# Patient Record
Sex: MALE | Race: WHITE | URBAN - METROPOLITAN AREA
[De-identification: names, ages, dates, MRNs, and addresses within clinical notes are randomized per-mention and may not be internally consistent; named-entity substitution may affect disease eponyms.]

---
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
## 2019-01-07 ENCOUNTER — APPOINTMENT (OUTPATIENT)
Dept: NEUROLOGY | Facility: CLINIC | Age: 19
End: 2019-01-07
Payer: MEDICAID

## 2019-01-07 VITALS
HEART RATE: 82 BPM | SYSTOLIC BLOOD PRESSURE: 128 MMHG | DIASTOLIC BLOOD PRESSURE: 58 MMHG | BODY MASS INDEX: 20.32 KG/M2 | WEIGHT: 150 LBS | HEIGHT: 72 IN

## 2019-01-07 DIAGNOSIS — R06.83 SNORING: ICD-10-CM

## 2019-01-07 DIAGNOSIS — Z78.9 OTHER SPECIFIED HEALTH STATUS: ICD-10-CM

## 2019-01-07 DIAGNOSIS — Z81.8 FAMILY HISTORY OF OTHER MENTAL AND BEHAVIORAL DISORDERS: ICD-10-CM

## 2019-01-07 PROCEDURE — 99204 OFFICE O/P NEW MOD 45 MIN: CPT

## 2019-01-07 NOTE — DISCUSSION/SUMMARY
[FreeTextEntry1] : Benjamin Nolan is a 18 year old man with chronic attention difficulties. \par In addition he has difficulty sleeping. \par \par 1. Attention disturbance - I think that it is likely that he has ADHD.\par I am ordering neurotrax to help clarify the diagnosis of ADHD.\par I am also ordering EEG to look for any encephalopathy or problems that would cause an ADHD like picture.\par We discussed non-medication strategies to help with ADHD - good sleep, healthy diet, regular exercise, taking breaks when studying, using a planner/organizer.\par After the above testing we can discuss different medication options.\par \par 2. Insomnia - sleep deprivation is also likely contributing to his attention disturbances.\par We discussed the following recommendations to improve sleep hygiene and insomnia.\par - The bed should only be used for sleep and intimacy. No reading or watching television in bed.\par -  Avoid trying to sleep/go to bed only when sleepy. If you cannot fall asleep at the beginning of the night or in the middle of the night get out of bed after 15 minutes and do something relaxing (read, watch television, etc.)\par -  Wake up at the same time every day.\par -  No naps during the day\par -  No caffeine after noon \par -  Do not watch the clock at night.\par - Avoid direct sunlight late in the day/ wear sunglasses after 4 PM\par - Do not use the computer/tablets for 1-2 hours prior to bedtime\par - Schedule thinking time early in the evening and have relaxation time for one hour before bed\par - Practice relaxation training that can be done at night if you cannot sleep\par - Exercise for 20 minutes in the late afternoon/early evening or take a hot bath 2-4 hours before bedtime\par \par Medication can be considered in the future if necessary.\par \par If his brother notes more snoring we can get a home sleep study to evaluate for obstructive sleep apnea.\par \par I will follow up with him in 3-4 weeks, sooner if needed. \par

## 2019-01-07 NOTE — PROCEDURE
[FreeTextEntry1] : Adult ADHD Self-Report Scale. \par He noted that symptoms were often or very often in 5 out of 6 symptoms in part A.\par He noted that symptoms are very often in 12 out of 13 in part B.

## 2019-01-07 NOTE — HISTORY OF PRESENT ILLNESS
[FreeTextEntry1] : He reports that he has been having a lot of difficulty focusing and is very fidgety.\par He says that this has always been an issue in childhood but he does not believe that he was every evaluated. \par He was able to skate by in high school and he graduated with an 85 average.\par He was able to get a 3.8 during his first semester in college but he had a 2.4 during his second semester. \par He is currently a sophomore at [employer]. \par He reports having difficulty focusing in class. He has difficulty focusing at home. He can get side tracked in the middle of a simple household chore. \par He says that he was hyperactive as a kid and he still is. \par He reports being impulsive. He impulsively makes decisions and buys things. \par \par He can fall asleep easily during the day but has difficulty sleeping at night.\par He goes to bed between 12-1 AM. He does not feel like he fully falls asleep. He constantly tosses and turns. He eventually gets out of bed between 9-10 AM. He feels as if he is not getting quality sleep.\par He tried taking melatonin but he did not feel any different.\par He shares a room with his brother who tells him that he snores. He has not witnessed pauses in his breathing but his brother typically falls asleep earlier.\par He always has an urge to move around. He taps his feet at night in his room. He says this is to stretch his back out which bothers him. 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 He does make small mistakes but he does not struggle as much as when he is at school. \par He describes his mood as "happy go star".\par \par He has a sister who has ADHD and is on Adderall.\par \par

## 2019-01-07 NOTE — PHYSICAL EXAM
[FreeTextEntry1] : Examination:\par Constitutional: normal, no apparent distress\par Eyes: normal conjunctiva b/l, no ptosis, visual fields full\par Respiratory: no respiratory distress, normal effort, normal auscultation\par Cardiovascular: normal rate, rhythm, no murmurs\par Neck: supple, no masses\par Vascular: carotids normal\par Skin: normal color, no rashes\par Psych: normal mood, affect\par \par Neurological:\par Memory: normal memory, oriented to person, place, time\par Language intact/no aphasia\par Cranial Nerves: II-XII intact, Pupils equally round and reactive to light, ocular muscles/movements intact, no ptosis, no facial weakness, tongue protrudes normally in the midline, \par Motor: normal tone, no pronator drift, full strength in all four extremities in the proximal and distal muscle groups\par Coordination: Fine motor movements intact, rapid alternating movements intact, finger to nose intact bilaterally\par Sensory: intact to light touch, vibration, joint position sense, negative Romberg examination\par DTRs: symmetric, 2+ in b/l triceps, 2+ in b/l biceps, 2+ in b/l brachioradialis, 2+ in bilateral patellars, 2+ in bilateral Achilles, Babinskis negative bilaterally\par Gait: narrow based, steady, able to walk on heels, toes, tandem gait\par \par Other: fidgeting, tapping foot, rapid speech\par

## 2019-01-07 NOTE — CONSULT LETTER
[Dear  ___] : Dear  [unfilled], [Consult Letter:] : I had the pleasure of evaluating your patient, [unfilled]. [Please see my note below.] : Please see my note below. [Consult Closing:] : Thank you very much for allowing me to participate in the care of this patient.  If you have any questions, please do not hesitate to contact me. [FreeTextEntry2] : Xavi Lopez [FreeTextEntry3] : Sincerely,\par \par \par Cassandra Aleman MD\par Diplomate, American Academy of Psychiatry and Neurology\par Board Certified in the Subspecialty of Clinical Neurophysiology\par Board Certified in the Subspecialty of Sleep Medicine\par Board Certified in the Subspecialty of Epilepsy\par

## 2019-01-14 ENCOUNTER — APPOINTMENT (OUTPATIENT)
Dept: NEUROLOGY | Facility: CLINIC | Age: 19
End: 2019-01-14
Payer: MEDICAID

## 2019-01-14 PROCEDURE — 95816 EEG AWAKE AND DROWSY: CPT

## 2019-01-24 ENCOUNTER — APPOINTMENT (OUTPATIENT)
Dept: NEUROLOGY | Facility: CLINIC | Age: 19
End: 2019-01-24
Payer: MEDICAID

## 2019-01-24 VITALS
DIASTOLIC BLOOD PRESSURE: 74 MMHG | WEIGHT: 150 LBS | SYSTOLIC BLOOD PRESSURE: 116 MMHG | HEART RATE: 85 BPM | BODY MASS INDEX: 20.32 KG/M2 | HEIGHT: 72 IN

## 2019-01-24 DIAGNOSIS — G47.00 INSOMNIA, UNSPECIFIED: ICD-10-CM

## 2019-01-24 DIAGNOSIS — H53.9 UNSPECIFIED VISUAL DISTURBANCE: ICD-10-CM

## 2019-01-24 PROCEDURE — 99213 OFFICE O/P EST LOW 20 MIN: CPT

## 2019-01-24 PROCEDURE — 96132 NRPSYC TST EVAL PHYS/QHP 1ST: CPT | Mod: 59

## 2019-01-24 NOTE — HISTORY OF PRESENT ILLNESS
[FreeTextEntry1] : I last saw Mr. Nolan on 1/7/19.\par He started his spring semester this week.\par He has no changes in his symptoms.\par He continues to have difficulty with paying attention.\par He says that his family has reported these symptoms for years.\par \par He felt stumped on the neurotrax test and the speed that was needed to complete it.\par He did find that he was able to focus on the computer test.\par \par He does say that he struggles when he is presented with information that he has to learn visually such as on a white board. \par Sometimes if he is reading something he may skip a word.\par \par He has not followed any of the recommendations for sleep hygiene.

## 2019-01-24 NOTE — DATA REVIEWED
[de-identified] : EEG 1/14/19: normal [de-identified] : Neurotrax 1/24/19:\par Global cognitive score 97.1\par Memory 97.4\par Executive function 105.8\par Attention 103\par Information processing speed 94.4\par Visual spatial 85\par More than 1 standard deviation below average in no domains.\par Below average in memory, information processing speed, visual spatial and global cognitive score\par Above average in executive function and attention

## 2019-01-24 NOTE — PROCEDURE
[FreeTextEntry1] : Neurotrax 1/24/19:\par Global cognitive score 97.1\par Memory 97.4\par Executive function 105.8\par Attention 103\par Information processing speed 94.4\par Visual spatial 85\par More than 1 standard deviation below average in no domains.\par Below average in memory, information processing speed, visual spatial and global cognitive score\par Above average in executive function and attention

## 2019-01-24 NOTE — DISCUSSION/SUMMARY
[FreeTextEntry1] : Benjamin Nolan is a 18 year old man with chronic attention difficulties. \par In addition he has difficulty sleeping. \par EEG is normal.\par Neurotrax showed scores which were below average in memory, information processing speed, visual spatial and global cognitive score and above average in executive function and attention.\par Although these scores are not classic for ADHD, his reported symptoms and the surveys that he filled out at the last visit are suggestive of ADHD.\par \par I am prescribing a trial of Adderall XR 20 mg daily. \par We discussed some common side effects of stimulants including appetite suppression, anxiety, palpitations, insomnia. I advised him to skip the dose on weekends so that he will decrease the chances of building a tolerance.\par \par We discussed other non medication strategies to help with ADHD.\par \par I am also ordering an MRI of the brain to rule out any structural changes since his visual spatial scores are lower than expected.\par \par I will follow up with him in about one month, sooner if needed. \par \par I again discussed the need to focus on sleep hygiene to help with insomnia.\par \par

## 2019-01-24 NOTE — PHYSICAL EXAM
[FreeTextEntry1] : Examination:\par Constitutional: normal, no apparent distress\par Eyes: normal conjunctiva b/l, no ptosis, visual fields full\par Respiratory: no respiratory distress, normal effort, normal auscultation\par Cardiovascular: normal rate, rhythm, no murmurs\par Neck: supple, no masses\par Vascular: carotids normal\par Skin: normal color, no rashes\par Psych: normal mood, affect\par \par Neurological:\par Memory: normal memory, oriented to person, place, time\par Language intact/no aphasia. Rapid speech\par Cranial Nerves: II-XII intact, Pupils equally round and reactive to light, ocular muscles/movements intact, no ptosis, no facial weakness, tongue protrudes normally in the midline, \par Motor: normal tone, no pronator drift, full strength in all four extremities in the proximal and distal muscle groups\par Coordination: Fine motor movements intact, rapid alternating movements intact, finger to nose intact bilaterally\par Sensory: intact to light touch\par DTRs: symmetric, normal\par Gait: narrow based, steady\par

## 2019-02-19 ENCOUNTER — APPOINTMENT (OUTPATIENT)
Dept: NEUROLOGY | Facility: CLINIC | Age: 19
End: 2019-02-19

## 2019-10-10 ENCOUNTER — APPOINTMENT (OUTPATIENT)
Dept: NEUROLOGY | Facility: CLINIC | Age: 19
End: 2019-10-10
Payer: MEDICAID

## 2019-10-10 VITALS
HEART RATE: 67 BPM | SYSTOLIC BLOOD PRESSURE: 143 MMHG | BODY MASS INDEX: 20.32 KG/M2 | WEIGHT: 150 LBS | HEIGHT: 72 IN | DIASTOLIC BLOOD PRESSURE: 56 MMHG

## 2019-10-10 PROCEDURE — 99213 OFFICE O/P EST LOW 20 MIN: CPT

## 2019-10-10 NOTE — DISCUSSION/SUMMARY
[FreeTextEntry1] : Benjamin Nolan is a 18 year old man with chronic attention difficulties. \par In addition he has difficulty sleeping. \par EEG is normal.\par Neurotrax showed scores which were below average in memory, information processing speed, visual spatial and global cognitive score and above average in executive function and attention.\par Although these scores are not classic for ADHD, his reported symptoms and the surveys that he filled out at the last visit are suggestive of ADHD.\par \par He has found Adderall ER 20 mg to be very effective and can continue to use this on school days.\par \par I advised him to continue to skip the dose on weekends so that he will decrease the chances of building a tolerance.\par \par He is trying to gain weight and is using nutritional supplements such as Ensure.\par We discussed other non medication strategies to help with ADHD.\par \par I again discussed the need to focus on sleep hygiene to help with insomnia.\par \par follow-up in six months, sooner if needed. \par

## 2019-10-10 NOTE — HISTORY OF PRESENT ILLNESS
[FreeTextEntry1] : I last saw Mr. Nolan on 1/24/19.\par He had shoulder surgery in August and is in physical therapy.\par He did not use his Adderall over the summer when he was not in school.\par He is in school with classes four days per week.\par On Mondays he has classes starting from 8 AM and ends at 3 PM.\par He is using Adderall for school days and finds it to be effective.\par He is able to focus in class. \par Last semester he had a 4.0 GPA.\par He has noticed a difference in his ability to concentrate if he does not take it.\par The brand of medication that the pharmacy has given him he likes better. He used to feel withdrawn but since he was switched from "grey" pills to "blue" pills he feels better.\par He sometimes has a dry mouth.\par Insomnia has been a problem for him prior to starting medication.\par Occasionally he feels some palpitations but he can slow it down with deep breathing. \par He had some appetite suppression initially but is now eating regularly.\par

## 2019-10-10 NOTE — CONSULT LETTER
[Dear  ___] : Dear  [unfilled], [Consult Letter:] : I had the pleasure of evaluating your patient, [unfilled]. [Please see my note below.] : Please see my note below. [FreeTextEntry2] : Xavi Lopez [Consult Closing:] : Thank you very much for allowing me to participate in the care of this patient.  If you have any questions, please do not hesitate to contact me. [FreeTextEntry3] : Sincerely,\par \par \par Cassandra Aleman MD\par Diplomate, American Academy of Psychiatry and Neurology\par Board Certified in the Subspecialty of Clinical Neurophysiology\par Board Certified in the Subspecialty of Sleep Medicine\par Board Certified in the Subspecialty of Epilepsy\par

## 2019-10-10 NOTE — DATA REVIEWED
[de-identified] : EEG 1/14/19: normal [de-identified] : Neurotrax 1/24/19:\par Global cognitive score 97.1\par Memory 97.4\par Executive function 105.8\par Attention 103\par Information processing speed 94.4\par Visual spatial 85\par More than 1 standard deviation below average in no domains.\par Below average in memory, information processing speed, visual spatial and global cognitive score\par Above average in executive function and attention

## 2019-11-25 ENCOUNTER — OTHER (OUTPATIENT)
Age: 19
End: 2019-11-25

## 2020-03-26 ENCOUNTER — APPOINTMENT (OUTPATIENT)
Dept: NEUROLOGY | Facility: CLINIC | Age: 20
End: 2020-03-26
Payer: MEDICAID

## 2020-03-26 VITALS — HEIGHT: 72 IN | BODY MASS INDEX: 20.59 KG/M2 | WEIGHT: 152 LBS

## 2020-03-26 DIAGNOSIS — R41.840 ATTENTION AND CONCENTRATION DEFICIT: ICD-10-CM

## 2020-03-26 PROCEDURE — 99213 OFFICE O/P EST LOW 20 MIN: CPT | Mod: 95

## 2020-03-26 RX ORDER — LISDEXAMFETAMINE DIMESYLATE 40 MG/1
40 CAPSULE ORAL
Qty: 30 | Refills: 0 | Status: ACTIVE | COMMUNITY
Start: 2020-03-26 | End: 1900-01-01

## 2020-03-26 NOTE — DISCUSSION/SUMMARY
[FreeTextEntry1] : Benjamin Nolan is a 19 year old man with chronic attention difficulties. \par \par EEG is normal.\par Neurotrax showed scores which were below average in memory, information processing speed, visual spatial and global cognitive score and above average in executive function and attention.\par Although these scores are not classic for ADHD, his reported symptoms and the surveys that he filled out are suggestive of ADHD. In addition, he had a beneficial response to stimulants.\par \par Recently, he is not having an optimal response to Adderall XR 20 mg.\par Will try Vyvanse at 40 mg/day. If not effective or if not affordable, will change back to Adderall XR and increase the dose to 25 mg.\par \par \par The [place] registry for controlled substances was consulted and reviewed by myself or a designated staff member prior to generating this prescription.\par \par \par Advised not to take medication too late which may result in sleep difficulties.\par \par Continue to engage in other strategies to help with focus such as healthy diet and exercise.\par \par He will follow up in the office within six months and should feel free to call with any questions/concerns.

## 2020-03-26 NOTE — HISTORY OF PRESENT ILLNESS
[Home] : at home, [unfilled] , at the time of the visit. [Clinic: ([facility])___] : at the clinic in [unfilled] [Patient & Provider:  (Enter provider's Role) ____] : The patient, [unfilled] and [unfilled] ~ecp~  participated in the telehealth encounter [FreeTextEntry2] : Benjamin Nolan [FreeTextEntry3] : Self [FreeTextEntry1] : \par This is a telehealth visit that was performed with the originating site at the patient’s home and the distant site of my office at [address] [place] [address].\par Two way audio and visual technology was used - [employer].\par Verbal consent to participate in the telephone/video visit was obtained in lieu of in-person signature due to the coronavirus emergency.\par This particular visit occurred during the 2020 COVID-19 emergency. I discussed with the patient the nature of our telehealth visits, that:\par -I would evaluate the patient and recommend diagnostics and treatments based on my assessment.\par -Our sessions are not being recorded.\par -The patient acknowledged the risk of unsecure transmission of his/her information.\par -Our team would provide follow up care in person if/when the patient needs it.\par \lise Alexander was last seen in the office on 10/19/19.\par He notes that he had been doing well with Adderall XR 20 mg/day. However, since about January he has noted that it is not working as consistently.\par he finds that he is not as focused and is having difficulty concentrating.\par He is currently doing online college work. 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 He also noted difficulty sleeping if he took it too late.\par He reports feeling "lonely" when he first took Adderall.\par \par He noticed that his heart rate was usually in the 80s-90s as recorded by his apple watch, when he was taking Adderall.\par He did once try Vyvanse from a friend, unsure of the dose, but felt somewhat withdrawn.\par \par \par \par

## 2020-03-26 NOTE — PHYSICAL EXAM
[FreeTextEntry1] : Examination:\par Patient is well appearing\par Neurological Examination:\par Mental Status: Awake, alert, oriented\par Language: No aphasia\par Cranial Nerves:\par PERRL, EOMI, No facial weakness, tongue protrudes in the midline\par Exam: No pronator drift. Barrel roll intact. Fine motor movements intact in hands\par Able to stand up from chair without difficulty\par Sensory: limited exam\par Reflexes: Unable to examine\par Cerebellar: Finger to nose intact bilaterally\par \par \par \par

## 2020-03-26 NOTE — DATA REVIEWED
[de-identified] : EEG 1/14/19: normal [de-identified] : Neurotrax 1/24/19:\par Global cognitive score 97.1\par Memory 97.4\par Executive function 105.8\par Attention 103\par Information processing speed 94.4\par Visual spatial 85\par More than 1 standard deviation below average in no domains.\par Below average in memory, information processing speed, visual spatial and global cognitive score\par Above average in executive function and attention

## 2020-04-02 ENCOUNTER — APPOINTMENT (OUTPATIENT)
Dept: NEUROLOGY | Facility: CLINIC | Age: 20
End: 2020-04-02

## 2021-01-04 RX ORDER — DEXTROAMPHETAMINE SACCHARATE, AMPHETAMINE ASPARTATE MONOHYDRATE, DEXTROAMPHETAMINE SULFATE AND AMPHETAMINE SULFATE 5; 5; 5; 5 MG/1; MG/1; MG/1; MG/1
20 CAPSULE, EXTENDED RELEASE ORAL
Qty: 14 | Refills: 0 | Status: ACTIVE | COMMUNITY
Start: 2019-01-24 | End: 1900-01-01

## 2022-07-12 ENCOUNTER — APPOINTMENT (OUTPATIENT)
Dept: NEUROLOGY | Facility: CLINIC | Age: 22
End: 2022-07-12

## 2023-11-25 ENCOUNTER — EMERGENCY (EMERGENCY)
Facility: HOSPITAL | Age: 23
LOS: 0 days | Discharge: HOME | End: 2023-11-25
Attending: FAMILY MEDICINE
Payer: MEDICAID

## 2023-11-25 VITALS
TEMPERATURE: 99 F | OXYGEN SATURATION: 99 % | HEART RATE: 65 BPM | HEIGHT: 72 IN | RESPIRATION RATE: 16 BRPM | SYSTOLIC BLOOD PRESSURE: 128 MMHG | WEIGHT: 149.91 LBS | DIASTOLIC BLOOD PRESSURE: 88 MMHG
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
## 2023-11-25 DIAGNOSIS — S61.213A LACERATION WITHOUT FOREIGN BODY OF LEFT MIDDLE FINGER WITHOUT DAMAGE TO NAIL, INITIAL ENCOUNTER: ICD-10-CM

## 2023-11-25 DIAGNOSIS — S61.211A LACERATION WITHOUT FOREIGN BODY OF LEFT INDEX FINGER WITHOUT DAMAGE TO NAIL, INITIAL ENCOUNTER: ICD-10-CM
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
## 2023-11-25 DIAGNOSIS — Z91.030 BEE ALLERGY STATUS: ICD-10-CM

## 2023-11-25 PROCEDURE — 99284 EMERGENCY DEPT VISIT MOD MDM: CPT | Mod: 25

## 2023-11-25 PROCEDURE — 12001 RPR S/N/AX/GEN/TRNK 2.5CM/<: CPT

## 2023-11-25 PROCEDURE — 99283 EMERGENCY DEPT VISIT LOW MDM: CPT | Mod: 25

## 2023-11-25 RX ORDER — CEPHALEXIN 500 MG
1 CAPSULE ORAL
Qty: 6 | Refills: 0
Start: 2023-11-25

## 2023-11-25 NOTE — ED STATDOCS - PHYSICAL EXAMINATION
PA NOTE: GEN: AOX3, NAD. HEENT: Throat clear. Airway is patent. EYES: PERRLA. EOMI. Head: NC/AT. NECK: Supple, No JVD. FROM. C-spine non-tender. CV:S1S2, RRR, LUNGS: Non-labored breathing, no tachypnea. O2sat 100% RA. CTA b/l. No w/r/r. CHEST: Equal chest expansion and rise. No deformity. ABD: Soft, NT/ND, no rebound, no guarding. No CVAT. EXT: No e/c/c. 2+ distal pulses. SKIN: No rashes. NEURO: No focal deficits. CN II-XII intact. FROM. 5/5 motor and sensory. ~Michi Eduardo PA-C PA NOTE: GEN: AOX3, NAD. HEENT: Throat clear. Airway is patent. EYES: PERRLA. EOMI. Head: NC/AT. NECK: Supple, No JVD. FROM. C-spine non-tender. CV:S1S2, RRR, LUNGS: Non-labored breathing, no tachypnea. O2sat 100% RA. CTA b/l. No w/r/r. CHEST: Equal chest expansion and rise. No deformity. ABD: Soft, NT/ND, no rebound, no guarding. No CVAT. EXT: No e/c/c. 2+ distal pulses. LEFT INDEX FINGER: +1.0cm superficial linear LAC noted over DIP area of dorsal aspect of left 2nd digit. FROM. NVI. No tendon deficits. Cap refill less than 2 sec. LEFT MIDDLE FINGER: +1.0cm superficial linear LAC noted over DIP area of dorsal aspect of left 3rd digit. FROM. NVI. No tendon deficits. Cap refill less than 2 sec. SKIN: No rashes. NEURO: No focal deficits. CN II-XII intact. FROM. 5/5 motor and sensory. ~Michi Eduardo PA-C

## 2023-11-25 NOTE — ED STATDOCS - PROGRESS NOTE DETAILS
PA note: 2 LACs repaired under sterile fashion, see procedure notes. Patient re-examined and re-evaluated. Patient feels much better at this time. ED evaluation, Diagnosis and management discussed with the patient in detail. Workup results discussed with ED attending, [place] to [place] home. Close PMD follow up encouraged, aftercare to assist with scheduling appointment ASAP. Strict ED return instructions discussed in detail and patient given the opportunity to ask any questions about their discharge diagnosis and instructions. Suture removal in 10-12 days. Patient verbalized understanding. ~ Michi Eduardo PA-C 22 y/o male presents to the ED c/o left finger lacerations. Pt was at work cutting a loaf of bread and he accidentally cut his fingers. Tdap UTD. No other complaints at this time. PA: Patient is a 24 y/o male with no significant PMHx who presents to [facility] c/o left index and middle finger lacerations. Pt was at work cutting a loaf of bread and he accidentally cut his fingers. Tdap UTD. No other complaints at this time. ~Michi Eduardo PA-C

## 2023-11-25 NOTE — ED STATDOCS - OBJECTIVE STATEMENT
24 y/o male presents to the ED c/o left finger lacerations. Pt was at work cutting a loaf of bread and he accidentally cut his fingers. Tdap UTD. No other complaints at this time.

## 2023-11-25 NOTE — ED PROCEDURE NOTE - CPROC ED ANATOMIC LOCATION1
finger
LEFT MIDDLE FINGER: +1.0cm superficial linear LAC noted over DIP area of dorsal aspect of left 3rd digit. FROM. No tendon deficits. Cap refill less than 2 sec./finger

## 2023-11-25 NOTE — ED PROCEDURE NOTE - NS ED ATTENDING STATEMENT MOD
This was a shared visit with the LEONELA. I reviewed and verified the documentation and independently performed the documented:
This was a shared visit with the LEONELA. I reviewed and verified the documentation and independently performed the documented:

## 2023-11-25 NOTE — ED ADULT TRIAGE NOTE - CHIEF COMPLAINT QUOTE
p/w laceration to L 2nd and 3rd fingers, around 12 pm today  tetanus not UTD. p/w laceration to L 2nd and 3rd fingers, around 12 pm today  tetanus not UTD. bleeding controlled

## 2023-11-25 NOTE — ED STATDOCS - SKIN, MLM
skin normal color for race, warm, dry. 2 small lacerations left lateral 2nd and 3rd digits. skin normal color for race, warm, dry. 2 small lacerations left lateral 2nd and 3rd digits. from sens pos

## 2023-11-25 NOTE — ED PROCEDURE NOTE - ATTENDING APP SHARED VISIT CONTRIBUTION OF CARE
Pt eval, treatment and plan contemporaneously with ACP Ali. I was available for consultation during key portion of procedure. Agree with notes. Kamilah VASQUEZ
Pt eval, treatment and plan contemporaneously with ACP Ali was availble for consultation during key portion of procedure. Agree with notes. Kamilah VASQUEZ

## 2023-11-25 NOTE — ED PROCEDURE NOTE - PROCEDURE ADDITIONAL DETAILS
bacitracin + telfa + dsd applied. +Hemostasis. Patient stable, tolerated well. ~Michi Eduardo PA-C
bacitracin + telfa + dsd applied. +Hemostasis. Patient stable, tolerated well. ~Michi Eduardo PA-C

## 2023-11-25 NOTE — ED STATDOCS - CLINICAL SUMMARY MEDICAL DECISION MAKING FREE TEXT BOX
24 y/o male with finger lacerations. Plan: laceration repair. 22 y/o male with finger lacerations. Plan: laceration repair.    PA note: 2 LACs repaired under sterile fashion, see procedure notes. Patient re-examined and re-evaluated. Patient feels much better at this time. ED evaluation, Diagnosis and management discussed with the patient in detail. Workup results discussed with ED attending, [place] to [place] home. Close PMD follow up encouraged, aftercare to assist with scheduling appointment ASAP. Strict ED return instructions discussed in detail and patient given the opportunity to ask any questions about their discharge diagnosis and instructions. Suture removal in 10-12 days. Patient verbalized understanding. ~ Michi Eduardo PA-C

## 2023-11-25 NOTE — ED STATDOCS - PATIENT PORTAL LINK FT
You can access the FollowMyHealth Patient Portal offered by [facility] by registering at the following website: [email or web address]. By joining [employer]’s FollowMyHealth portal, you will also be able to view your health information using other applications (apps) compatible with our system.

## 2023-11-25 NOTE — ED PROCEDURE NOTE - FINGER LOCATION
middle
LEFT INDEX FINGER: +1.0cm superficial linear LAC noted over DIP area of dorsal aspect of left 2nd digit. FROM. No tendon deficits. Cap refill less than 2 sec./index